# Patient Record
Sex: FEMALE | Race: WHITE | NOT HISPANIC OR LATINO | Employment: UNEMPLOYED | URBAN - METROPOLITAN AREA
[De-identification: names, ages, dates, MRNs, and addresses within clinical notes are randomized per-mention and may not be internally consistent; named-entity substitution may affect disease eponyms.]

---

## 2018-01-04 ENCOUNTER — HOSPITAL ENCOUNTER (OUTPATIENT)
Dept: MAMMOGRAPHY | Facility: HOSPITAL | Age: 33
Discharge: HOME | End: 2018-01-04
Attending: OBSTETRICS & GYNECOLOGY | Admitting: OBSTETRICS & GYNECOLOGY

## 2019-11-16 ENCOUNTER — HOSPITAL ENCOUNTER (EMERGENCY)
Facility: HOSPITAL | Age: 34
Discharge: HOME | End: 2019-11-16
Admitting: EMERGENCY MEDICINE

## 2019-11-16 VITALS
WEIGHT: 134.7 LBS | BODY MASS INDEX: 23.87 KG/M2 | TEMPERATURE: 98.6 F | HEART RATE: 88 BPM | RESPIRATION RATE: 16 BRPM | HEIGHT: 63 IN | DIASTOLIC BLOOD PRESSURE: 74 MMHG | OXYGEN SATURATION: 99 % | SYSTOLIC BLOOD PRESSURE: 115 MMHG

## 2019-11-16 DIAGNOSIS — H60.91 OTITIS EXTERNA OF RIGHT EAR, UNSPECIFIED CHRONICITY, UNSPECIFIED TYPE: ICD-10-CM

## 2019-11-16 DIAGNOSIS — R68.84 JAW PAIN: ICD-10-CM

## 2019-11-16 DIAGNOSIS — K08.89 PAIN, DENTAL: Primary | ICD-10-CM

## 2019-11-16 PROCEDURE — 99283 EMERGENCY DEPT VISIT LOW MDM: CPT

## 2019-11-16 RX ORDER — CIPROFLOXACIN AND DEXAMETHASONE 3; 1 MG/ML; MG/ML
4 SUSPENSION/ DROPS AURICULAR (OTIC) 2 TIMES DAILY
Qty: 7.5 ML | Refills: 0 | OUTPATIENT
Start: 2019-11-16 | End: 2020-02-15

## 2019-11-16 RX ORDER — TRAMADOL HYDROCHLORIDE 50 MG/1
50 TABLET ORAL EVERY 6 HOURS PRN
Qty: 6 TABLET | Refills: 0 | Status: SHIPPED | OUTPATIENT
Start: 2019-11-16 | End: 2020-02-15 | Stop reason: SDUPTHER

## 2019-11-16 RX ORDER — TRAMADOL HYDROCHLORIDE 50 MG/1
50 TABLET ORAL ONCE
Status: COMPLETED | OUTPATIENT
Start: 2019-11-16 | End: 2019-11-16

## 2019-11-16 RX ADMIN — TRAMADOL HYDROCHLORIDE 50 MG: 50 TABLET, FILM COATED ORAL at 12:23

## 2019-11-16 NOTE — ED PROVIDER NOTES
Subjective   Patient is a 34-year-old  female with no past medical history who presents the ER complaining of right-sided jaw pain and dental pain for 4 days.  Patient reports that she has had dental caries in the past, reports that she went to the ER a couple weeks ago and received clindamycin for possible infection.  Patient reports that she took a few days of this but stopped due to side effect of diarrhea.  Patient states that pain has been progressively getting worse for the past few days, mostly in her right jaw, does not radiate, denies swelling, redness or drainage.  Patient describes the pain as a throbbing pain is constant, rates it a 6/10.  She denies any other symptoms, no abdominal, nausea, vomiting, chest pain shortness of breath headache or fever.  She reports that she has an appointment with her dentist on 11/20, 4 days from now, she states that she has been taking ibuprofen at home which has provided no relief.  Patient also complains of some right ear pain, no discharge, bleeding or tinnitus.  Patient denies sore throat, difficulty breathing, throat swelling, cough or congestion.            Review of Systems   Constitutional: Negative for fever.   HENT: Positive for dental problem. Negative for congestion, drooling, facial swelling, postnasal drip, sore throat, trouble swallowing and voice change.    Respiratory: Negative for shortness of breath and wheezing.    Cardiovascular: Negative for chest pain.   Gastrointestinal: Negative for abdominal pain.   Genitourinary: Negative for dysuria.   Musculoskeletal: Positive for arthralgias.        Right-sided jaw pain   Skin: Negative for rash.   Neurological: Negative for weakness and headaches.   Psychiatric/Behavioral: Negative for behavioral problems.   All other systems reviewed and are negative.      History reviewed. No pertinent past medical history.    No Known Allergies    History reviewed. No pertinent surgical history.    History  "reviewed. No pertinent family history.    Social History     Socioeconomic History   • Marital status: Single     Spouse name: Not on file   • Number of children: Not on file   • Years of education: Not on file   • Highest education level: Not on file   Tobacco Use   • Smoking status: Current Every Day Smoker     Packs/day: 0.50     Types: Cigarettes   • Smokeless tobacco: Never Used   Substance and Sexual Activity   • Alcohol use: No     Frequency: Never   • Drug use: No           Objective   Physical Exam   Constitutional: She is oriented to person, place, and time. She appears well-developed and well-nourished. No distress.   HENT:   Head: Normocephalic and atraumatic.   Eyes: EOM are normal. Pupils are equal, round, and reactive to light.   Neck: Normal range of motion. Neck supple.   Cervical spine: No midline tenderness to palpation. No step-offs or deformities. Pain-free range of motion.   Cardiovascular: Normal rate, regular rhythm, normal heart sounds and intact distal pulses.   No murmur heard.  Pulmonary/Chest: Effort normal and breath sounds normal. She has no wheezes.   Abdominal: Soft. Bowel sounds are normal.   Musculoskeletal: Normal range of motion. She exhibits tenderness.   Lymphadenopathy:     She has no cervical adenopathy.   Neurological: She is alert and oriented to person, place, and time. No sensory deficit.   Skin: Skin is warm. Capillary refill takes less than 2 seconds. No rash noted.   No overlying erythema, rash or ecchymosis  No fluctuance or abscess   Psychiatric: She has a normal mood and affect. Her behavior is normal. Thought content normal.   Vitals reviewed.      Procedures           ED Course    /74 (BP Location: Right arm, Patient Position: Sitting)   Pulse 88   Temp 98.6 °F (37 °C) (Oral)   Resp 16   Ht 160 cm (63\")   Wt 61.1 kg (134 lb 11.2 oz)   LMP 09/23/2019 (Approximate)   SpO2 99%   Breastfeeding? No   BMI 23.86 kg/m²   Labs Reviewed - No data to " display  Medications   traMADol (ULTRAM) tablet 50 mg (50 mg Oral Given 11/16/19 1223)     No radiology results for the last day                MDM  Number of Diagnoses or Management Options  Jaw pain:   Otitis externa of right ear, unspecified chronicity, unspecified type:   Pain, dental:   Diagnosis management comments: MEDICAL DECISION  Comorbidities: Dental caries  Differentials: Prior dental abscess, dental caries, Kirill angina, otitis media otitis externa, mastoiditis; this list is not all inclusive and does not constitute the entirety of considered causes    Patient seen and evaluated by myself here in the emergency room.  Patient has obvious dental caries of tooth 29, 28, 27 on lower right jaw, as well as tooth 18, 19 of left lower jaw.  Patient is missing teeth 31 and 30.  On examination there is no signs of periodontal or periapical abscess, no fluctuance, drainage or bleeding noted.  No significant swelling of face or jaw, no Jazmyne's angina.  Patient tender to palpation along surface of the right jaw, no radiation.  Patient appears to have mild erythema of the external auditory canal of the right ear, with most likely otitis externa.  Patient has no tenderness palpation of mastoid process bilaterally.  Patient has no cervical adenopathy.  Patient's uvula was midline, no posterior pharynx erythema or edema, exudate.   Patient made afebrile, nontoxic in appearance and in no acute respiratory distress throughout emergency room stay.  Patient reported that she had been on clindamycin for some time but stopped this due to side effect of diarrhea.  Patient will be discharged with prescription for Ciprodex solution and tramadol for pain.  Inspect was queried.  Patient strongly recommended to keep her appointment with her dentist on 11/20.  Discharge plan and instructions were discussed with the patient who verbalized understanding and is in agreement with the plan, all questions were answered at this time.   Patient is aware of signs symptoms that would require immediate return to the emergency room.  Patient understands importance of following up with primary care provider for further valuation and worsening concerns.    Patient was discharged in improved stable condition with a right steady gait.        Final diagnoses:   Pain, dental   Otitis externa of right ear, unspecified chronicity, unspecified type   Jaw pain              Shonda Sanchez PA  11/16/19 3405

## 2019-11-16 NOTE — ED NOTES
"Tooth pain on the right lower side of jaw, reports seeing dentist, reports \"I cant take the pain today\"     Shelly Gonzales RN  11/16/19 1128       Shelly Gonzales RN  11/16/19 1129    "

## 2019-11-16 NOTE — DISCHARGE INSTRUCTIONS
Take ibuprofen and Tylenol as needed for pain.  Do not mix improving with Advil, Aleve, Motrin, diclofenac or naproxen.  May take tramadol as needed for increased pain.  Apply eardrops 2 drops 4 times a day in the left ear.    Apply ice to jaw as needed for swelling and pain.  Apply 20-minute increments to 3 times a day.    Follow-up with primary care provider as needed for new or worsening concerns.  Keep appointment with dentist this week on 11/20, follow-up with them regarding dental pain.    Return to ER for new or worsening symptoms, increased jaw pain, ear pain, chest pain shortness breath headache or fever.

## 2020-02-14 PROCEDURE — 99283 EMERGENCY DEPT VISIT LOW MDM: CPT

## 2020-02-15 ENCOUNTER — HOSPITAL ENCOUNTER (EMERGENCY)
Facility: HOSPITAL | Age: 35
Discharge: HOME | End: 2020-02-15
Admitting: EMERGENCY MEDICINE

## 2020-02-15 VITALS
OXYGEN SATURATION: 99 % | RESPIRATION RATE: 16 BRPM | BODY MASS INDEX: 23.83 KG/M2 | HEART RATE: 71 BPM | HEIGHT: 63 IN | DIASTOLIC BLOOD PRESSURE: 83 MMHG | TEMPERATURE: 98.3 F | SYSTOLIC BLOOD PRESSURE: 124 MMHG | WEIGHT: 134.48 LBS

## 2020-02-15 DIAGNOSIS — K02.9 DENTAL CARIES: ICD-10-CM

## 2020-02-15 DIAGNOSIS — K08.89 PAIN, DENTAL: Primary | ICD-10-CM

## 2020-02-15 RX ORDER — TRAMADOL HYDROCHLORIDE 50 MG/1
50 TABLET ORAL EVERY 6 HOURS PRN
Qty: 6 TABLET | Refills: 0 | Status: SHIPPED | OUTPATIENT
Start: 2020-02-15

## 2020-02-15 RX ORDER — TRAMADOL HYDROCHLORIDE 50 MG/1
50 TABLET ORAL ONCE
Status: COMPLETED | OUTPATIENT
Start: 2020-02-15 | End: 2020-02-15

## 2020-02-15 RX ORDER — AMOXICILLIN AND CLAVULANATE POTASSIUM 875; 125 MG/1; MG/1
1 TABLET, FILM COATED ORAL EVERY 12 HOURS
Qty: 14 TABLET | Refills: 0 | Status: SHIPPED | OUTPATIENT
Start: 2020-02-15 | End: 2020-02-22

## 2020-02-15 RX ADMIN — TRAMADOL HYDROCHLORIDE 50 MG: 50 TABLET, FILM COATED ORAL at 01:37

## 2020-02-15 RX ADMIN — LIDOCAINE HYDROCHLORIDE: 20 SOLUTION ORAL; TOPICAL at 01:38

## 2020-02-15 NOTE — DISCHARGE INSTRUCTIONS
Use tramadol and dental balls as needed for pain.  Do not operate heavy machinery or drink alcohol while taking tramadol.    Follow-up with your dentist office Monday morning for further evaluation or go to dental offices as listed below.    Return to ED for any new or worsening symptoms    Take antibiotic as prescribed.  Be sure to take full course.Consider taking probiotic or eating yogurt daily while on antibiotic and up to 10 days after to replace the good bacteria in your gastrointestinal tract; this can reduce chances of developing a GI infection such as C difficile    Follow-up with your primary care provider in 3-5 days.  If you do not have a primary care provider call [phone] for help in finding one, or you may follow up with [facility] at [phone].

## 2020-02-15 NOTE — ED PROVIDER NOTES
Subjective   History of Present Illness  Patient is a 35-year-old female with no significant PMH who presents with complaints of right lower jaw and dental pain that has been intermittent over the past 5 months.  Patient states he has progressed over the past several days states she has not followed up with a dentist but does have an appointment on March 11 currently rates her pain an 8/10 severity.  Describes as a throbbing type pain she tried taking Tylenol and ibuprofen which does help some with her pain states pain radiates into her right ear at times.  She denies any purulent drainage trouble swallowing sore throat or voice change.  She also denies any fever nausea vomiting abdominal pain chest pain shortness of breath.  Review of Systems   Constitutional: Negative.    HENT: Positive for dental problem and ear pain. Negative for congestion, drooling, ear discharge, nosebleeds, rhinorrhea, sinus pressure, sinus pain, sore throat and trouble swallowing.    Respiratory: Negative.    Cardiovascular: Negative.    Musculoskeletal: Negative for neck pain and neck stiffness.   Skin: Negative.    Neurological: Negative.        No past medical history on file.    No Known Allergies    No past surgical history on file.    No family history on file.    Social History     Socioeconomic History   • Marital status: Single     Spouse name: Not on file   • Number of children: Not on file   • Years of education: Not on file   • Highest education level: Not on file   Tobacco Use   • Smoking status: Current Every Day Smoker     Packs/day: 0.50     Types: Cigarettes   • Smokeless tobacco: Never Used   Substance and Sexual Activity   • Alcohol use: No     Frequency: Never   • Drug use: No           Objective   Physical Exam   Constitutional: She is oriented to person, place, and time. She appears well-developed and well-nourished. No distress.   HENT:   Head: Normocephalic and atraumatic.   Right Ear: External ear normal.   Left  "Ear: External ear normal.   Mouth/Throat: Uvula is midline, oropharynx is clear and moist and mucous membranes are normal. No oral lesions. No trismus in the jaw. Dental caries present. No dental abscesses or uvula swelling. No oropharyngeal exudate, posterior oropharyngeal edema, posterior oropharyngeal erythema or tonsillar abscesses. No tonsillar exudate.   Several dental caries noted tenderness to palpation along the right lower gumline with no surrounding erythema edema or abscess noted   Eyes: Pupils are equal, round, and reactive to light. EOM are normal. No scleral icterus.   Cardiovascular: Normal rate, regular rhythm, normal heart sounds and intact distal pulses. Exam reveals no gallop and no friction rub.   No murmur heard.  Pulmonary/Chest: Effort normal. No stridor. She has no wheezes. She has no rales.   Neurological: She is alert and oriented to person, place, and time. No cranial nerve deficit or sensory deficit.   Skin: Skin is warm. Capillary refill takes less than 2 seconds. No rash noted. She is not diaphoretic. No erythema.   Psychiatric: She has a normal mood and affect. Her behavior is normal.   Nursing note and vitals reviewed.      Procedures           ED Course    /83   Pulse 71   Temp 98.3 °F (36.8 °C)   Resp 16   Ht 160 cm (63\")   Wt 61 kg (134 lb 7.7 oz)   LMP 01/28/2020 (Approximate)   SpO2 99%   Breastfeeding No   BMI 23.82 kg/m²   Medications   traMADol (ULTRAM) tablet 50 mg (has no administration in time range)   dental ball oral suspension with diphenhydrAMINE (has no administration in time range)     Labs Reviewed - No data to display  No radiology results for the last day                                         MDM  Number of Diagnoses or Management Options  Pain, dental:   Diagnosis management comments: Chart Review:  Comorbidity: None  Differentials: Dental abscess, peritonsillar abscess, tumor, dental caries;this list is not all inclusive and does not constitute " the entirety of considered causes  Imaging: Was interpreted by physician and reviewed by myself: Not warranted  Disposition/Treatment:  While in the ED patient was afebrile.  Nontoxic there were no patient was swelling noted no fluctuant abscess noted patient was given tramadol and dental balls for pain patient does have appointment with her dentist next month she was also given [facility] of dentistry's number and immediate dentist number for further evaluation as needed she will be given Augmentin for home inspect queried given small dose of tramadol from as well.  Findings persist patient and family at bedside she voiced understanding of importance of following up with dentist for further evaluation      Final diagnoses:   Pain, dental   Dental caries            García Remy PA  02/15/20 0123

## 2021-07-16 ENCOUNTER — TRANSCRIBE ORDERS (OUTPATIENT)
Dept: ADMINISTRATIVE | Facility: HOSPITAL | Age: 36
End: 2021-07-16

## 2021-07-16 DIAGNOSIS — Z12.31 VISIT FOR SCREENING MAMMOGRAM: Primary | ICD-10-CM

## 2021-12-21 ENCOUNTER — HOSPITAL ENCOUNTER (EMERGENCY)
Facility: HOSPITAL | Age: 36
Discharge: HOME | End: 2021-12-21
Attending: EMERGENCY MEDICINE | Admitting: EMERGENCY MEDICINE

## 2021-12-21 ENCOUNTER — APPOINTMENT (OUTPATIENT)
Dept: CT IMAGING | Facility: HOSPITAL | Age: 36
End: 2021-12-21

## 2021-12-21 ENCOUNTER — APPOINTMENT (OUTPATIENT)
Dept: GENERAL RADIOLOGY | Facility: HOSPITAL | Age: 36
End: 2021-12-21

## 2021-12-21 VITALS
TEMPERATURE: 98.4 F | WEIGHT: 143.52 LBS | DIASTOLIC BLOOD PRESSURE: 65 MMHG | OXYGEN SATURATION: 100 % | SYSTOLIC BLOOD PRESSURE: 109 MMHG | RESPIRATION RATE: 17 BRPM | BODY MASS INDEX: 25.43 KG/M2 | HEIGHT: 63 IN | HEART RATE: 69 BPM

## 2021-12-21 DIAGNOSIS — R51.9 NONINTRACTABLE EPISODIC HEADACHE, UNSPECIFIED HEADACHE TYPE: Primary | ICD-10-CM

## 2021-12-21 DIAGNOSIS — M54.2 NECK PAIN: ICD-10-CM

## 2021-12-21 LAB
ALBUMIN SERPL-MCNC: 4.3 G/DL (ref 3.5–5.2)
ALBUMIN/GLOB SERPL: 1.7 G/DL
ALP SERPL-CCNC: 60 U/L (ref 39–117)
ALT SERPL W P-5'-P-CCNC: 12 U/L (ref 1–33)
AMPHET+METHAMPHET UR QL: NEGATIVE
ANION GAP SERPL CALCULATED.3IONS-SCNC: 9 MMOL/L (ref 5–15)
APTT PPP: 25.4 SECONDS (ref 24–31)
AST SERPL-CCNC: 12 U/L (ref 1–32)
B-HCG UR QL: NEGATIVE
BARBITURATES UR QL SCN: NEGATIVE
BASOPHILS # BLD AUTO: 0 10*3/MM3 (ref 0–0.2)
BASOPHILS NFR BLD AUTO: 0.4 % (ref 0–1.5)
BENZODIAZ UR QL SCN: NEGATIVE
BILIRUB SERPL-MCNC: 0.2 MG/DL (ref 0–1.2)
BILIRUB UR QL STRIP: NEGATIVE
BUN SERPL-MCNC: 8 MG/DL (ref 6–20)
BUN/CREAT SERPL: 11.1 (ref 7–25)
CALCIUM SPEC-SCNC: 9 MG/DL (ref 8.6–10.5)
CANNABINOIDS SERPL QL: NEGATIVE
CHLORIDE SERPL-SCNC: 104 MMOL/L (ref 98–107)
CK SERPL-CCNC: 48 U/L (ref 20–180)
CLARITY UR: CLEAR
CO2 SERPL-SCNC: 26 MMOL/L (ref 22–29)
COCAINE UR QL: NEGATIVE
COLOR UR: YELLOW
CREAT SERPL-MCNC: 0.72 MG/DL (ref 0.57–1)
D DIMER PPP FEU-MCNC: 0.33 MG/L (FEU) (ref 0–0.59)
DEPRECATED RDW RBC AUTO: 48.1 FL (ref 37–54)
EOSINOPHIL # BLD AUTO: 0.1 10*3/MM3 (ref 0–0.4)
EOSINOPHIL NFR BLD AUTO: 2 % (ref 0.3–6.2)
ERYTHROCYTE [DISTWIDTH] IN BLOOD BY AUTOMATED COUNT: 16.1 % (ref 12.3–15.4)
GFR SERPL CREATININE-BSD FRML MDRD: 91 ML/MIN/1.73
GLOBULIN UR ELPH-MCNC: 2.5 GM/DL
GLUCOSE SERPL-MCNC: 82 MG/DL (ref 65–99)
GLUCOSE UR STRIP-MCNC: NEGATIVE MG/DL
HCT VFR BLD AUTO: 36.2 % (ref 34–46.6)
HGB BLD-MCNC: 12.6 G/DL (ref 12–15.9)
HGB UR QL STRIP.AUTO: NEGATIVE
HOLD SPECIMEN: NORMAL
INR PPP: 1.02 (ref 0.93–1.1)
KETONES UR QL STRIP: NEGATIVE
LEUKOCYTE ESTERASE UR QL STRIP.AUTO: NEGATIVE
LYMPHOCYTES # BLD AUTO: 1.4 10*3/MM3 (ref 0.7–3.1)
LYMPHOCYTES NFR BLD AUTO: 26.6 % (ref 19.6–45.3)
MAGNESIUM SERPL-MCNC: 2.1 MG/DL (ref 1.6–2.6)
MCH RBC QN AUTO: 29.3 PG (ref 26.6–33)
MCHC RBC AUTO-ENTMCNC: 34.7 G/DL (ref 31.5–35.7)
MCV RBC AUTO: 84.4 FL (ref 79–97)
METHADONE UR QL SCN: NEGATIVE
MONOCYTES # BLD AUTO: 0.5 10*3/MM3 (ref 0.1–0.9)
MONOCYTES NFR BLD AUTO: 8.4 % (ref 5–12)
NEUTROPHILS NFR BLD AUTO: 3.4 10*3/MM3 (ref 1.7–7)
NEUTROPHILS NFR BLD AUTO: 62.6 % (ref 42.7–76)
NITRITE UR QL STRIP: NEGATIVE
NRBC BLD AUTO-RTO: 0.1 /100 WBC (ref 0–0.2)
OPIATES UR QL: POSITIVE
OXYCODONE UR QL SCN: NEGATIVE
PH UR STRIP.AUTO: 7 [PH] (ref 5–8)
PLATELET # BLD AUTO: 294 10*3/MM3 (ref 140–450)
PMV BLD AUTO: 8 FL (ref 6–12)
POTASSIUM SERPL-SCNC: 4.3 MMOL/L (ref 3.5–5.2)
PROT SERPL-MCNC: 6.8 G/DL (ref 6–8.5)
PROT UR QL STRIP: NEGATIVE
PROTHROMBIN TIME: 11.3 SECONDS (ref 9.6–11.7)
RBC # BLD AUTO: 4.29 10*6/MM3 (ref 3.77–5.28)
SODIUM SERPL-SCNC: 139 MMOL/L (ref 136–145)
SP GR UR STRIP: 1.01 (ref 1–1.03)
TROPONIN T SERPL-MCNC: <0.01 NG/ML (ref 0–0.03)
TSH SERPL DL<=0.05 MIU/L-ACNC: 1.43 UIU/ML (ref 0.27–4.2)
UROBILINOGEN UR QL STRIP: NORMAL
WBC NRBC COR # BLD: 5.4 10*3/MM3 (ref 3.4–10.8)

## 2021-12-21 PROCEDURE — 25010000002 KETOROLAC TROMETHAMINE PER 15 MG: Performed by: EMERGENCY MEDICINE

## 2021-12-21 PROCEDURE — 25010000002 ONDANSETRON PER 1 MG: Performed by: EMERGENCY MEDICINE

## 2021-12-21 PROCEDURE — 80050 GENERAL HEALTH PANEL: CPT | Performed by: EMERGENCY MEDICINE

## 2021-12-21 PROCEDURE — 85730 THROMBOPLASTIN TIME PARTIAL: CPT | Performed by: EMERGENCY MEDICINE

## 2021-12-21 PROCEDURE — 96375 TX/PRO/DX INJ NEW DRUG ADDON: CPT

## 2021-12-21 PROCEDURE — 80307 DRUG TEST PRSMV CHEM ANLYZR: CPT | Performed by: EMERGENCY MEDICINE

## 2021-12-21 PROCEDURE — 85610 PROTHROMBIN TIME: CPT | Performed by: EMERGENCY MEDICINE

## 2021-12-21 PROCEDURE — 83735 ASSAY OF MAGNESIUM: CPT | Performed by: EMERGENCY MEDICINE

## 2021-12-21 PROCEDURE — 71045 X-RAY EXAM CHEST 1 VIEW: CPT

## 2021-12-21 PROCEDURE — 81003 URINALYSIS AUTO W/O SCOPE: CPT | Performed by: EMERGENCY MEDICINE

## 2021-12-21 PROCEDURE — 96374 THER/PROPH/DIAG INJ IV PUSH: CPT

## 2021-12-21 PROCEDURE — 36415 COLL VENOUS BLD VENIPUNCTURE: CPT

## 2021-12-21 PROCEDURE — 25010000002 HYDROMORPHONE 1 MG/ML SOLUTION: Performed by: EMERGENCY MEDICINE

## 2021-12-21 PROCEDURE — 0 IOPAMIDOL PER 1 ML: Performed by: EMERGENCY MEDICINE

## 2021-12-21 PROCEDURE — 84484 ASSAY OF TROPONIN QUANT: CPT | Performed by: EMERGENCY MEDICINE

## 2021-12-21 PROCEDURE — 81025 URINE PREGNANCY TEST: CPT | Performed by: EMERGENCY MEDICINE

## 2021-12-21 PROCEDURE — 82550 ASSAY OF CK (CPK): CPT | Performed by: EMERGENCY MEDICINE

## 2021-12-21 PROCEDURE — 99283 EMERGENCY DEPT VISIT LOW MDM: CPT

## 2021-12-21 PROCEDURE — 85379 FIBRIN DEGRADATION QUANT: CPT | Performed by: EMERGENCY MEDICINE

## 2021-12-21 PROCEDURE — 70491 CT SOFT TISSUE NECK W/DYE: CPT

## 2021-12-21 PROCEDURE — 70450 CT HEAD/BRAIN W/O DYE: CPT

## 2021-12-21 PROCEDURE — 36415 COLL VENOUS BLD VENIPUNCTURE: CPT | Performed by: EMERGENCY MEDICINE

## 2021-12-21 RX ORDER — SODIUM CHLORIDE 0.9 % (FLUSH) 0.9 %
10 SYRINGE (ML) INJECTION AS NEEDED
Status: DISCONTINUED | OUTPATIENT
Start: 2021-12-21 | End: 2021-12-21 | Stop reason: HOSPADM

## 2021-12-21 RX ORDER — KETOROLAC TROMETHAMINE 15 MG/ML
15 INJECTION, SOLUTION INTRAMUSCULAR; INTRAVENOUS ONCE
Status: COMPLETED | OUTPATIENT
Start: 2021-12-21 | End: 2021-12-21

## 2021-12-21 RX ORDER — ONDANSETRON 2 MG/ML
4 INJECTION INTRAMUSCULAR; INTRAVENOUS ONCE
Status: COMPLETED | OUTPATIENT
Start: 2021-12-21 | End: 2021-12-21

## 2021-12-21 RX ADMIN — ONDANSETRON 4 MG: 2 INJECTION INTRAMUSCULAR; INTRAVENOUS at 11:34

## 2021-12-21 RX ADMIN — HYDROMORPHONE HYDROCHLORIDE 0.5 MG: 1 INJECTION, SOLUTION INTRAMUSCULAR; INTRAVENOUS; SUBCUTANEOUS at 11:34

## 2021-12-21 RX ADMIN — IOPAMIDOL 100 ML: 755 INJECTION, SOLUTION INTRAVENOUS at 11:25

## 2021-12-21 RX ADMIN — SODIUM CHLORIDE 500 ML: 9 INJECTION, SOLUTION INTRAVENOUS at 09:18

## 2021-12-21 RX ADMIN — KETOROLAC TROMETHAMINE 15 MG: 15 INJECTION, SOLUTION INTRAMUSCULAR; INTRAVENOUS at 09:18

## 2021-12-21 NOTE — DISCHARGE INSTRUCTIONS
You need to make sure you were evaluated for your recurrent pain in your left breast.  You need to be treated, evaluated to make sure there are no signs of cancer.
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
## 2022-02-09 ENCOUNTER — TRANSCRIBE ORDERS (OUTPATIENT)
Dept: PHYSICAL THERAPY | Facility: CLINIC | Age: 37
End: 2022-02-09

## 2022-02-09 DIAGNOSIS — M54.16 LUMBAR RADICULOPATHY: Primary | ICD-10-CM

## 2022-03-14 ENCOUNTER — TREATMENT (OUTPATIENT)
Dept: PHYSICAL THERAPY | Facility: CLINIC | Age: 37
End: 2022-03-14

## 2022-03-14 DIAGNOSIS — M54.16 RADICULOPATHY, LUMBAR REGION: Primary | ICD-10-CM

## 2022-03-14 PROCEDURE — 97162 PT EVAL MOD COMPLEX 30 MIN: CPT | Performed by: PHYSICAL THERAPIST
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
## 2022-03-22 ENCOUNTER — TREATMENT (OUTPATIENT)
Dept: PHYSICAL THERAPY | Facility: CLINIC | Age: 37
End: 2022-03-22

## 2022-03-22 DIAGNOSIS — M54.16 RADICULOPATHY, LUMBAR REGION: Primary | ICD-10-CM

## 2022-03-22 PROCEDURE — 97110 THERAPEUTIC EXERCISES: CPT | Performed by: PHYSICAL THERAPIST

## 2022-03-22 PROCEDURE — 97140 MANUAL THERAPY 1/> REGIONS: CPT | Performed by: PHYSICAL THERAPIST

## 2022-03-22 NOTE — PROGRESS NOTES
"   Physical Therapy Daily Progress Note    Patient: Sumi Dale   : 1985  Diagnosis/ICD-10 Code:  Radiculopathy, lumbar region [M54.16]  Referring practitioner: LILI Lindsey  Date of Initial Visit: Type: THERAPY  Noted: 3/14/2022  Today's Date: 3/22/2022  Patient seen for 2 sessions         Sumi Dale reports:  Numbness in cervical spine and shoulders today (typically her baseline discomfort).  Lower back still sore, feels like it is \"pinching\".       Objective   See Exercise, Manual, and Modality Logs for complete treatment.       Assessment/Plan     Tolerates manual therapy to cervical and lumbar spine well today.  Noted hypomobility in dylan hips.  Patient feeling well at end of visit.     Progress per Plan of Care           Manual Therapy:    30     mins  40123;  Therapeutic Exercise:    8     mins  84978;     Neuromuscular Sabrina:        mins  05139;    Therapeutic Activity:          mins  92214;     Gait Training:           mins  28714;     Ultrasound:          mins  08671;    Electrical Stimulation:         mins  54675 ( );  Dry Needling          mins self-pay    Timed Treatment:   38   mins   Total Treatment:     38   mins    Rojas Crawford PT  Physical Therapist                      "

## 2022-03-30 ENCOUNTER — TREATMENT (OUTPATIENT)
Dept: PHYSICAL THERAPY | Facility: CLINIC | Age: 37
End: 2022-03-30

## 2022-03-30 DIAGNOSIS — M54.16 RADICULOPATHY, LUMBAR REGION: Primary | ICD-10-CM

## 2022-03-30 PROCEDURE — 97112 NEUROMUSCULAR REEDUCATION: CPT | Performed by: PHYSICAL THERAPIST

## 2022-03-30 PROCEDURE — 97140 MANUAL THERAPY 1/> REGIONS: CPT | Performed by: PHYSICAL THERAPIST

## 2022-03-30 NOTE — PROGRESS NOTES
Physical Therapy Daily Progress Note    VISIT#: 3    Subjective   Sumi Dale reports that her neck is feeling a little better but the pain in her low back is still bad.   Pain Ratin    Objective     See Exercise, Manual, and Modality Logs for complete treatment.     Patient Education: muscle tension, diaphragmatic breathing, HEP    Assessment/Plan   Pt presented with improved neck symptoms but severe back pain. Treatment focused on decreased muscle tone and improved ROM. Pt tolerated manual therapy well and exhibits increased tone in L QL and hip abductors. Educated pt on referred pain, trigger points and benefits of diaphragmatic breathing. Updated HEP and advised pt to keep all exercises pain free, pt showed good understanding.     Progress per Plan of Care and Progress strengthening /stabilization /functional activity          Timed:         Manual Therapy:    25     mins  53377;     Therapeutic Exercise:         mins  21258;     Neuromuscular Sabrina:    15    mins  93661;    Therapeutic Activity:          mins  57717;     Gait Training:           mins  39115;     Ultrasound:          mins  49932;    Ionto                                   mins   09671  Self Care                            mins   76676  Canalith Repos                   mins  93907    Un-Timed:  Electrical Stimulation:         mins  16600 ( );  Dry Needling          mins self-pay  Traction          mins 51725  Low Eval          Mins  58143  Mod Eval          Mins  08537  High Eval                            Mins  01173  Re-Eval                               mins  90444    Timed Treatment:   40   mins   Total Treatment:     40   mins          Jackie Salazar  Physical Therapist Assistant  [place] License # [id number]

## 2022-04-06 ENCOUNTER — TREATMENT (OUTPATIENT)
Dept: PHYSICAL THERAPY | Facility: CLINIC | Age: 37
End: 2022-04-06

## 2022-04-06 DIAGNOSIS — M54.16 RADICULOPATHY, LUMBAR REGION: Primary | ICD-10-CM

## 2022-04-06 PROCEDURE — 97140 MANUAL THERAPY 1/> REGIONS: CPT | Performed by: PHYSICAL THERAPIST

## 2022-04-06 NOTE — PROGRESS NOTES
Physical Therapy Daily Progress Note    Patient: Sumi Dale   : 1985  Diagnosis/ICD-10 Code:  Radiculopathy, lumbar region [M54.16]  Referring practitioner: LILI Lindsey  Date of Initial Visit: Type: THERAPY  Noted: 3/14/2022  Today's Date: 2022  Patient seen for 4 sessions         Sumi Dale reports:  Lower back continues to be painful with prolonged standing.  Still getting pain in bilateral feet after long shift at work.  Patient reports neck pain has been decreasing.     Objective   See Exercise, Manual, and Modality Logs for complete treatment.       Assessment/Plan     Tolerates manual therapy well.  Noted limited PROM with right sidebending/left rotation of lower cervical spine.  Feeling well at end of session.     Progress per Plan of Care           Manual Therapy:    40     mins  80035;  Therapeutic Exercise:         mins  86488;     Neuromuscular Sabrina:        mins  89669;    Therapeutic Activity:          mins  87912;     Gait Training:           mins  19692;     Ultrasound:          mins  58621;    Electrical Stimulation:         mins  48512 ( );  Dry Needling          mins self-pay    Timed Treatment:   40   mins   Total Treatment:     40   mins    Rojas Crawford PT  Physical Therapist

## 2022-04-14 ENCOUNTER — TREATMENT (OUTPATIENT)
Dept: PHYSICAL THERAPY | Facility: CLINIC | Age: 37
End: 2022-04-14

## 2022-04-14 DIAGNOSIS — M54.16 RADICULOPATHY, LUMBAR REGION: Primary | ICD-10-CM

## 2022-04-14 PROCEDURE — 97110 THERAPEUTIC EXERCISES: CPT | Performed by: PHYSICAL THERAPIST

## 2022-04-14 PROCEDURE — 97140 MANUAL THERAPY 1/> REGIONS: CPT | Performed by: PHYSICAL THERAPIST

## 2022-04-14 NOTE — PROGRESS NOTES
Physical Therapy Daily Progress Note    VISIT#: 5    Subjective   Sumi Dale reports that her neck is hurting today and it has moved down into her thoracic spine.   Pain Ratin    Objective     See Exercise, Manual, and Modality Logs for complete treatment.     Patient Education: HEP, muscle tone, thoracic mobility    Assessment/Plan   Pt requested to shorten session today due to other obligations. Pt presented with increased neck and thoracic pain. Pt exhibits increased tone in the thoracic ES and hypomobility of the upper thoracic spine. Pt had mild tenderness with manual therapy but tolerated well. Educated pt on importance of improving mobility and added open books and thread the needle to HEP.     Progress per Plan of Care and Progress strengthening /stabilization /functional activity          Timed:         Manual Therapy:    16     mins  12789;     Therapeutic Exercise:    12     mins  90249;     Neuromuscular Sabrina:        mins  63088;    Therapeutic Activity:          mins  21693;     Gait Training:           mins  90274;     Ultrasound:          mins  76514;    Ionto                                   mins   35737  Self Care                            mins   66216  Canalith Repos                   mins  70640    Un-Timed:  Electrical Stimulation:         mins  77645 ( );  Dry Needling          mins self-pay  Traction          mins 04369  Low Eval          Mins  28319  Mod Eval          Mins  66530  High Eval                            Mins  51055  Re-Eval                               mins  03534    Timed Treatment:   28   mins   Total Treatment:     28   mins          Jackie Salazar  Physical Therapist Assistant  [place] License # [id number]

## 2022-04-19 ENCOUNTER — TREATMENT (OUTPATIENT)
Dept: PHYSICAL THERAPY | Facility: CLINIC | Age: 37
End: 2022-04-19

## 2022-04-19 DIAGNOSIS — M54.16 RADICULOPATHY, LUMBAR REGION: Primary | ICD-10-CM

## 2022-04-19 PROCEDURE — 97140 MANUAL THERAPY 1/> REGIONS: CPT | Performed by: PHYSICAL THERAPIST

## 2022-04-19 PROCEDURE — 97110 THERAPEUTIC EXERCISES: CPT | Performed by: PHYSICAL THERAPIST

## 2022-04-19 NOTE — PROGRESS NOTES
Re-Assessment / Re-Certification        Patient: Sumi Dale   : 1985  Diagnosis/ICD-10 Code:  Radiculopathy, lumbar region [M54.16]  Referring practitioner: LILI Lindsey  Date of Initial Visit: Type: THERAPY  Noted: 3/14/2022  Today's Date: 2022  Patient seen for 6 sessions      Subjective:   Sumi Dale reports: Lower back/neck continue to be painful.  Patient reports missing a few days of work due to the pain.  Reports compliance with HEP.  Clinical Progress: unchanged  Home Program Compliance: Yes  Treatment has included: therapeutic exercise, neuromuscular re-education, manual therapy, therapeutic activity and moist heat     Pain level in cervical spine 7/10  Pain level in lumbar spine 9/10     Objective          Tenderness     Additional Tenderness Details  C3-4, C4-5, C5-6 hypomobile and TTP with UPA on right    T2-T4 TTP with PA glide and hypomobile    Active Range of Motion   Cervical/Thoracic Spine   Cervical    Left lateral flexion: 20 degrees   Right lateral flexion: 15 degrees with pain  Left rotation: 50 degrees with pain  Right rotation: 60 degrees     Passive Range of Motion   Left Hip   External rotation (90/90): WFL  Internal rotation (90/90): 35 degrees with pain    Right Hip   External rotation (90/90): WFL  Internal rotation (90/90): 40 degrees       Assessment/Plan  Progress toward previous goals: Not Met     Goals:  In two weeks, patient will report at least 25% reduction in pain level.  NM  In two weeks, patient will demonstrate at least 25% improvement in AROM in cervical spine and lumbar spine.  NM    In four weeks, patient will demonstrate at least 60 degrees of cervical rotation bilaterally. NM  In four weeks, patient will demonstrate lumbar AROM WFL without pain level over 2/10.  NM  In four weeks, patient will demonstrate decreased perceived disability by decreasing score NDI and Oswestry by at least 12% each.  NM    New Goals  Short-term goals (STG): In two  weeks, patient will report completing work activities without pain level greater than 4/10 for at least three consecutive days.   Long-term goals (LTG):   In four weeks, patient will demonstrate cervical AROM wnl without pain level over 2/10.   In four weeks, patient will demonstrate decreased perceived disability by decreasing score NDI and Oswestry by at least 12% each.      Recommendations: Continue as planned  Timeframe: 1 month  Prognosis to achieve goals: fair    PT Signature: Rojas Crawford PT      Based upon review of the patient's progress and continued therapy plan, it is my medical opinion that Sumi Dale should continue physical therapy treatment at [facility]  [address] [place] [id number] [phone].    Signature: __________________________________  Fredo Werner PA    Manual Therapy:    30     mins  21480;  Therapeutic Exercise:    15     mins  79791;     Neuromuscular Sabrina:        mins  94647;    Therapeutic Activity:          mins  96935;     Gait Training:           mins  98221;     Ultrasound:          mins  45148;    Electrical Stimulation:         mins  69739 ( );  Dry Needling          mins self-pay    Timed Treatment:   45   mins   Total Treatment:     45   mins

## 2022-04-21 ENCOUNTER — HOSPITAL ENCOUNTER (OUTPATIENT)
Dept: MAMMOGRAPHY | Facility: HOSPITAL | Age: 37
Discharge: HOME | End: 2022-04-21
Admitting: OBSTETRICS & GYNECOLOGY

## 2022-04-21 DIAGNOSIS — Z12.31 VISIT FOR SCREENING MAMMOGRAM: ICD-10-CM

## 2022-04-21 PROCEDURE — 77067 SCR MAMMO BI INCL CAD: CPT

## 2022-04-21 PROCEDURE — 77063 BREAST TOMOSYNTHESIS BI: CPT

## 2022-04-28 ENCOUNTER — TREATMENT (OUTPATIENT)
Dept: PHYSICAL THERAPY | Facility: CLINIC | Age: 37
End: 2022-04-28

## 2022-04-28 DIAGNOSIS — M54.16 RADICULOPATHY, LUMBAR REGION: Primary | ICD-10-CM

## 2022-04-28 PROCEDURE — 97110 THERAPEUTIC EXERCISES: CPT | Performed by: PHYSICAL THERAPIST

## 2022-04-28 PROCEDURE — 97140 MANUAL THERAPY 1/> REGIONS: CPT | Performed by: PHYSICAL THERAPIST

## 2022-04-28 PROCEDURE — 97530 THERAPEUTIC ACTIVITIES: CPT | Performed by: PHYSICAL THERAPIST

## 2022-04-28 NOTE — PROGRESS NOTES
Physical Therapy Daily Progress Note    VISIT#: 7    Subjective   Sumi Dale reports that the pain in her left hip and low back is sometimes less severe than it used to be. She is having the most pain today in her L knee.   Pain Ratin    Objective     See Exercise, Manual, and Modality Logs for complete treatment.     Patient Education: possible MRI scheduled    Assessment/Plan   Pt exhibits less tenderness and less complaints of pain with bed mobility and exercises in clinic than in previous sessions. Pt has been off work for about a week now which she states has possibly helped some of her pain. She saw her MD yesterday and states that he would not give her a note excusing her from work for the time being, but she believes he is attempting to schedule an MRI.     Progress per Plan of Care and Progress strengthening /stabilization /functional activity          Timed:         Manual Therapy:    18     mins  66352;     Therapeutic Exercise:    15     mins  28977;     Neuromuscular Sabrina:        mins  05303;    Therapeutic Activity:     8     mins  84838;     Gait Training:           mins  45908;     Ultrasound:          mins  90936;    Ionto                                   mins   80010  Self Care                            mins   50381  Canalith Repos                   mins  35159    Un-Timed:  Electrical Stimulation:         mins  22401 ( );  Dry Needling          mins self-pay  Traction          mins 56794  Low Eval          Mins  00882  Mod Eval          Mins  39664  High Eval                            Mins  65388  Re-Eval                               mins  36263    Timed Treatment:   41   mins   Total Treatment:     41   mins          Jackie Salazar  Physical Therapist Assistant  [place] License # [id number]

## 2022-05-02 ENCOUNTER — HOSPITAL ENCOUNTER (OUTPATIENT)
Dept: MAMMOGRAPHY | Facility: HOSPITAL | Age: 37
Discharge: HOME | End: 2022-05-02

## 2022-05-02 ENCOUNTER — HOSPITAL ENCOUNTER (OUTPATIENT)
Dept: ULTRASOUND IMAGING | Facility: HOSPITAL | Age: 37
Discharge: HOME | End: 2022-05-02

## 2022-05-02 DIAGNOSIS — N64.89 BREAST ASYMMETRY: ICD-10-CM

## 2022-05-02 PROCEDURE — G0279 TOMOSYNTHESIS, MAMMO: HCPCS

## 2022-05-02 PROCEDURE — 76642 ULTRASOUND BREAST LIMITED: CPT

## 2022-05-02 PROCEDURE — 77066 DX MAMMO INCL CAD BI: CPT

## 2022-05-09 ENCOUNTER — TRANSCRIBE ORDERS (OUTPATIENT)
Dept: ADMINISTRATIVE | Facility: HOSPITAL | Age: 37
End: 2022-05-09

## 2022-05-09 DIAGNOSIS — R92.8 ABNORMAL MAMMOGRAM: Primary | ICD-10-CM

## 2022-05-12 ENCOUNTER — TRANSCRIBE ORDERS (OUTPATIENT)
Dept: ADMINISTRATIVE | Facility: HOSPITAL | Age: 37
End: 2022-05-12

## 2022-05-12 DIAGNOSIS — R92.8 ABNORMAL MAMMOGRAM: Primary | ICD-10-CM

## 2022-11-02 ENCOUNTER — HOSPITAL ENCOUNTER (OUTPATIENT)
Dept: ULTRASOUND IMAGING | Facility: HOSPITAL | Age: 37
End: 2022-11-02

## 2022-11-02 ENCOUNTER — APPOINTMENT (OUTPATIENT)
Dept: MAMMOGRAPHY | Facility: HOSPITAL | Age: 37
End: 2022-11-02

## 2024-07-08 ENCOUNTER — HOSPITAL ENCOUNTER (EMERGENCY)
Facility: HOSPITAL | Age: 39
Discharge: HOME | End: 2024-07-08
Admitting: EMERGENCY MEDICINE
Payer: MEDICAID

## 2024-07-08 ENCOUNTER — APPOINTMENT (OUTPATIENT)
Dept: GENERAL RADIOLOGY | Facility: HOSPITAL | Age: 39
End: 2024-07-08
Payer: MEDICAID

## 2024-07-08 VITALS
BODY MASS INDEX: 23.56 KG/M2 | WEIGHT: 138 LBS | HEART RATE: 77 BPM | SYSTOLIC BLOOD PRESSURE: 102 MMHG | TEMPERATURE: 98 F | OXYGEN SATURATION: 100 % | DIASTOLIC BLOOD PRESSURE: 66 MMHG | RESPIRATION RATE: 16 BRPM | HEIGHT: 64 IN

## 2024-07-08 DIAGNOSIS — N39.0 UTI (URINARY TRACT INFECTION), UNCOMPLICATED: Primary | ICD-10-CM

## 2024-07-08 LAB
ALBUMIN SERPL-MCNC: 4.2 G/DL (ref 3.5–5.2)
ALBUMIN/GLOB SERPL: 1.8 G/DL
ALP SERPL-CCNC: 69 U/L (ref 39–117)
ALT SERPL W P-5'-P-CCNC: 11 U/L (ref 1–33)
ANION GAP SERPL CALCULATED.3IONS-SCNC: 9.6 MMOL/L (ref 5–15)
AST SERPL-CCNC: 11 U/L (ref 1–32)
B-HCG UR QL: NEGATIVE
BACTERIA UR QL AUTO: ABNORMAL /HPF
BASOPHILS # BLD AUTO: 0.04 10*3/MM3 (ref 0–0.2)
BASOPHILS NFR BLD AUTO: 0.6 % (ref 0–1.5)
BILIRUB SERPL-MCNC: 0.4 MG/DL (ref 0–1.2)
BILIRUB UR QL STRIP: NEGATIVE
BUN SERPL-MCNC: 11 MG/DL (ref 6–20)
BUN/CREAT SERPL: 18.3 (ref 7–25)
CALCIUM SPEC-SCNC: 8.7 MG/DL (ref 8.6–10.5)
CHLORIDE SERPL-SCNC: 107 MMOL/L (ref 98–107)
CLARITY UR: CLEAR
CO2 SERPL-SCNC: 21.4 MMOL/L (ref 22–29)
COLOR UR: YELLOW
CREAT SERPL-MCNC: 0.6 MG/DL (ref 0.57–1)
DEPRECATED RDW RBC AUTO: 47.9 FL (ref 37–54)
EGFRCR SERPLBLD CKD-EPI 2021: 115.8 ML/MIN/1.73
EOSINOPHIL # BLD AUTO: 0.45 10*3/MM3 (ref 0–0.4)
EOSINOPHIL NFR BLD AUTO: 6.4 % (ref 0.3–6.2)
ERYTHROCYTE [DISTWIDTH] IN BLOOD BY AUTOMATED COUNT: 15 % (ref 12.3–15.4)
FLUAV RNA RESP QL NAA+PROBE: NOT DETECTED
FLUBV RNA RESP QL NAA+PROBE: NOT DETECTED
GLOBULIN UR ELPH-MCNC: 2.4 GM/DL
GLUCOSE SERPL-MCNC: 93 MG/DL (ref 65–99)
GLUCOSE UR STRIP-MCNC: NEGATIVE MG/DL
HCT VFR BLD AUTO: 36 % (ref 34–46.6)
HGB BLD-MCNC: 11.1 G/DL (ref 12–15.9)
HGB UR QL STRIP.AUTO: NEGATIVE
HYALINE CASTS UR QL AUTO: ABNORMAL /LPF
IMM GRANULOCYTES # BLD AUTO: 0.01 10*3/MM3 (ref 0–0.05)
IMM GRANULOCYTES NFR BLD AUTO: 0.1 % (ref 0–0.5)
KETONES UR QL STRIP: NEGATIVE
LEUKOCYTE ESTERASE UR QL STRIP.AUTO: ABNORMAL
LYMPHOCYTES # BLD AUTO: 1.68 10*3/MM3 (ref 0.7–3.1)
LYMPHOCYTES NFR BLD AUTO: 23.8 % (ref 19.6–45.3)
MCH RBC QN AUTO: 27.1 PG (ref 26.6–33)
MCHC RBC AUTO-ENTMCNC: 30.8 G/DL (ref 31.5–35.7)
MCV RBC AUTO: 87.8 FL (ref 79–97)
MONOCYTES # BLD AUTO: 0.48 10*3/MM3 (ref 0.1–0.9)
MONOCYTES NFR BLD AUTO: 6.8 % (ref 5–12)
NEUTROPHILS NFR BLD AUTO: 4.41 10*3/MM3 (ref 1.7–7)
NEUTROPHILS NFR BLD AUTO: 62.3 % (ref 42.7–76)
NITRITE UR QL STRIP: NEGATIVE
NRBC BLD AUTO-RTO: 0 /100 WBC (ref 0–0.2)
PH UR STRIP.AUTO: 8 [PH] (ref 5–8)
PLATELET # BLD AUTO: 257 10*3/MM3 (ref 140–450)
PMV BLD AUTO: 9.9 FL (ref 6–12)
POTASSIUM SERPL-SCNC: 3.8 MMOL/L (ref 3.5–5.2)
PROT SERPL-MCNC: 6.6 G/DL (ref 6–8.5)
PROT UR QL STRIP: NEGATIVE
RBC # BLD AUTO: 4.1 10*6/MM3 (ref 3.77–5.28)
RBC # UR STRIP: ABNORMAL /HPF
REF LAB TEST METHOD: ABNORMAL
RSV RNA RESP QL NAA+PROBE: NOT DETECTED
S PYO AG THROAT QL: NEGATIVE
SARS-COV-2 RNA RESP QL NAA+PROBE: NOT DETECTED
SODIUM SERPL-SCNC: 138 MMOL/L (ref 136–145)
SP GR UR STRIP: 1.01 (ref 1–1.03)
SQUAMOUS #/AREA URNS HPF: ABNORMAL /HPF
UROBILINOGEN UR QL STRIP: ABNORMAL
WBC # UR STRIP: ABNORMAL /HPF
WBC NRBC COR # BLD AUTO: 7.07 10*3/MM3 (ref 3.4–10.8)

## 2024-07-08 PROCEDURE — 81025 URINE PREGNANCY TEST: CPT | Performed by: NURSE PRACTITIONER

## 2024-07-08 PROCEDURE — 99284 EMERGENCY DEPT VISIT MOD MDM: CPT

## 2024-07-08 PROCEDURE — 96365 THER/PROPH/DIAG IV INF INIT: CPT

## 2024-07-08 PROCEDURE — 25010000002 CEFTRIAXONE PER 250 MG: Performed by: NURSE PRACTITIONER

## 2024-07-08 PROCEDURE — 93005 ELECTROCARDIOGRAM TRACING: CPT

## 2024-07-08 PROCEDURE — 87086 URINE CULTURE/COLONY COUNT: CPT | Performed by: NURSE PRACTITIONER

## 2024-07-08 PROCEDURE — 85025 COMPLETE CBC W/AUTO DIFF WBC: CPT | Performed by: NURSE PRACTITIONER

## 2024-07-08 PROCEDURE — 87637 SARSCOV2&INF A&B&RSV AMP PRB: CPT | Performed by: NURSE PRACTITIONER

## 2024-07-08 PROCEDURE — 63710000001 ONDANSETRON ODT 4 MG TABLET DISPERSIBLE: Performed by: NURSE PRACTITIONER

## 2024-07-08 PROCEDURE — 87651 STREP A DNA AMP PROBE: CPT | Performed by: NURSE PRACTITIONER

## 2024-07-08 PROCEDURE — 71045 X-RAY EXAM CHEST 1 VIEW: CPT

## 2024-07-08 PROCEDURE — 81001 URINALYSIS AUTO W/SCOPE: CPT | Performed by: NURSE PRACTITIONER

## 2024-07-08 PROCEDURE — 80053 COMPREHEN METABOLIC PANEL: CPT | Performed by: NURSE PRACTITIONER

## 2024-07-08 RX ORDER — DOXYCYCLINE 100 MG/1
100 CAPSULE ORAL ONCE
Status: COMPLETED | OUTPATIENT
Start: 2024-07-08 | End: 2024-07-08

## 2024-07-08 RX ORDER — METRONIDAZOLE 500 MG/1
2000 TABLET ORAL ONCE
Status: DISCONTINUED | OUTPATIENT
Start: 2024-07-08 | End: 2024-07-08

## 2024-07-08 RX ORDER — METRONIDAZOLE 500 MG/1
500 TABLET ORAL ONCE
Status: COMPLETED | OUTPATIENT
Start: 2024-07-08 | End: 2024-07-08

## 2024-07-08 RX ORDER — SODIUM CHLORIDE 0.9 % (FLUSH) 0.9 %
10 SYRINGE (ML) INJECTION AS NEEDED
Status: DISCONTINUED | OUTPATIENT
Start: 2024-07-08 | End: 2024-07-08 | Stop reason: HOSPADM

## 2024-07-08 RX ORDER — ONDANSETRON 4 MG/1
4 TABLET, ORALLY DISINTEGRATING ORAL ONCE
Status: COMPLETED | OUTPATIENT
Start: 2024-07-08 | End: 2024-07-08

## 2024-07-08 RX ORDER — DOXYCYCLINE HYCLATE 100 MG/1
100 TABLET, DELAYED RELEASE ORAL 2 TIMES DAILY
Qty: 14 TABLET | Refills: 0 | Status: SHIPPED | OUTPATIENT
Start: 2024-07-08 | End: 2024-07-15

## 2024-07-08 RX ORDER — AZITHROMYCIN 250 MG/1
1000 TABLET, FILM COATED ORAL ONCE
Status: DISCONTINUED | OUTPATIENT
Start: 2024-07-08 | End: 2024-07-08

## 2024-07-08 RX ADMIN — ONDANSETRON 4 MG: 4 TABLET, ORALLY DISINTEGRATING ORAL at 16:20

## 2024-07-08 RX ADMIN — METRONIDAZOLE 500 MG: 500 TABLET ORAL at 16:20

## 2024-07-08 RX ADMIN — CEFTRIAXONE 2000 MG: 2 INJECTION, POWDER, FOR SOLUTION INTRAMUSCULAR; INTRAVENOUS at 14:45

## 2024-07-08 RX ADMIN — DOXYCYCLINE 100 MG: 100 CAPSULE ORAL at 16:20

## 2024-07-08 NOTE — ED NOTES
Pt presents via POV with multiple complaints. Pt reports acute worsening bilat hip pain and back pain onset yesterday, pt reports she has degenerative disc disease. Pt also reports flu-like symptoms onset 5 days ago, with cough. Pt also reports bilat ear pain as well. Pt alert/oriented/ambulatory.

## 2024-07-08 NOTE — ED PROVIDER NOTES
"Subjective   History of Present Illness  39-year-old female presents to the emergency room with a wide constellation of complaints some of which include \"just feeling sick since yesterday\", low-grade fever, cough and congestion, sore throat, body aches, dysuria, \"might have been exposed to sexually transmitted disease\", headache, nausea.      Review of Systems   Constitutional:  Positive for activity change, appetite change, fatigue and fever.   HENT:  Positive for congestion, ear pain and sore throat.    Respiratory:  Positive for cough.    Gastrointestinal:  Positive for nausea. Negative for abdominal pain, diarrhea and vomiting.   Genitourinary:  Positive for dysuria and urgency. Negative for vaginal bleeding, vaginal discharge and vaginal pain.   Musculoskeletal:  Positive for arthralgias and myalgias. Negative for back pain.   Skin:  Negative for rash and wound.   Neurological:  Positive for headaches.       History reviewed. No pertinent past medical history.    No Known Allergies    History reviewed. No pertinent surgical history.    History reviewed. No pertinent family history.    Social History     Socioeconomic History    Marital status: Single   Tobacco Use    Smoking status: Every Day     Current packs/day: 0.50     Types: Cigarettes    Smokeless tobacco: Never   Substance and Sexual Activity    Alcohol use: No    Drug use: No           Objective   Physical Exam  Vitals reviewed.   Constitutional:       General: She is not in acute distress.     Appearance: Normal appearance. She is not ill-appearing, toxic-appearing or diaphoretic.   HENT:      Head: Normocephalic and atraumatic.      Right Ear: Tympanic membrane, ear canal and external ear normal.      Left Ear: Tympanic membrane, ear canal and external ear normal.      Nose: No congestion or rhinorrhea.      Mouth/Throat:      Mouth: Mucous membranes are moist.      Pharynx: Oropharynx is clear.   Eyes:      Conjunctiva/sclera: Conjunctivae normal. "      Pupils: Pupils are equal, round, and reactive to light.   Cardiovascular:      Rate and Rhythm: Normal rate.      Pulses: Normal pulses.   Pulmonary:      Effort: Pulmonary effort is normal.   Abdominal:      Palpations: Abdomen is soft.      Tenderness: There is no abdominal tenderness. There is no guarding or rebound.   Musculoskeletal:         General: Normal range of motion.      Cervical back: Normal range of motion and neck supple.   Skin:     General: Skin is warm and dry.      Capillary Refill: Capillary refill takes less than 2 seconds.   Neurological:      General: No focal deficit present.      Mental Status: She is alert and oriented to person, place, and time.   Psychiatric:         Mood and Affect: Mood normal.         Behavior: Behavior normal.         Thought Content: Thought content normal.         Judgment: Judgment normal.         Procedures           ED Course      Labs Reviewed   URINALYSIS W/ CULTURE IF INDICATED - Abnormal; Notable for the following components:       Result Value    Leuk Esterase, UA Small (1+) (*)     All other components within normal limits    Narrative:     In absence of clinical symptoms, the presence of pyuria, bacteria, and/or nitrites on the urinalysis result does not correlate with infection.   COMPREHENSIVE METABOLIC PANEL - Abnormal; Notable for the following components:    CO2 21.4 (*)     All other components within normal limits    Narrative:     GFR Normal >60  Chronic Kidney Disease <60  Kidney Failure <15     CBC WITH AUTO DIFFERENTIAL - Abnormal; Notable for the following components:    Hemoglobin 11.1 (*)     MCHC 30.8 (*)     Eosinophil % 6.4 (*)     Eosinophils, Absolute 0.45 (*)     All other components within normal limits   URINALYSIS, MICROSCOPIC ONLY - Abnormal; Notable for the following components:    WBC, UA 6-10 (*)     Bacteria, UA 1+ (*)     Squamous Epithelial Cells, UA 7-12 (*)     All other components within normal limits    COVID-19/FLUA&B/RSV, NP SWAB IN TRANSPORT MEDIA 1 HR TAT - Normal    Narrative:     Fact sheet for providers: https://www.fda.gov/media/796133/download    Fact sheet for patients: https://www.fda.gov/media/653074/download    Test performed by PCR.   RAPID STREP A SCREEN - Normal   PREGNANCY, URINE - Normal   URINE CULTURE   CBC AND DIFFERENTIAL    Narrative:     The following orders were created for panel order CBC & Differential.  Procedure                               Abnormality         Status                     ---------                               -----------         ------                     CBC Auto Differential[[id number]]        Abnormal            Final result                 Please view results for these tests on the individual orders.                                 XR Chest 1 View    Result Date: 7/8/2024  Impression: No active pulmonary process. Electronically Signed: Rico Neves MD  7/8/2024 11:43 AM EDT  Workstation ID: [id number]          Medications   sodium chloride 0.9 % flush 10 mL (has no administration in time range)   cefTRIAXone (ROCEPHIN) 2,000 mg in sodium chloride 0.9 % 100 mL MBP (0 mg Intravenous Stopped 7/8/24 1515)   ondansetron ODT (ZOFRAN-ODT) disintegrating tablet 4 mg (4 mg Oral Given 7/8/24 1620)   metroNIDAZOLE (FLAGYL) tablet 500 mg (500 mg Oral Given 7/8/24 1620)   doxycycline (MONODOX) capsule 100 mg (100 mg Oral Given 7/8/24 1620)      Medical Decision Making  39-year-old female presents to the ER for a multitude of symptoms from various body systems.    Problems Addressed:  UTI (urinary tract infection), uncomplicated: complicated acute illness or injury     Details: Urinalysis is significant for 1+ bacteria, 6-10 white blood cells and 1+ leukocytes.  Although positive for squamous epithelials will treat prophylactically with 2 g of Rocephin via peripheral IV prior to discharge.  Also states that she has a possible STI exposure.  Will discharge home on Flagyl  regimen and doxycycline regimen.    Amount and/or Complexity of Data Reviewed  Labs: ordered.     Details: Labs Reviewed  URINALYSIS W/ CULTURE IF INDICATED - Abnormal; Notable for the following components:     Leuk Esterase, UA             Small (1+) (*)            All other components within normal limits         Narrative: In absence of clinical symptoms, the presence of pyuria, bacteria, and/or nitrites on the urinalysis result does not correlate with infection.  COMPREHENSIVE METABOLIC PANEL - Abnormal; Notable for the following components:     CO2                           21.4 (*)            All other components within normal limits         Narrative: GFR Normal >60                  Chronic Kidney Disease <60                  Kidney Failure <15                    CBC WITH AUTO DIFFERENTIAL - Abnormal; Notable for the following components:     Hemoglobin                    11.1 (*)               MCHC                          30.8 (*)               Eosinophil %                  6.4 (*)                Eosinophils, Absolute         0.45 (*)            All other components within normal limits  URINALYSIS, MICROSCOPIC ONLY - Abnormal; Notable for the following components:     WBC, UA                       6-10 (*)               Bacteria, UA                  1+ (*)                 Squamous Epithelial Cells, UA   7-12 (*)            All other components within normal limits  COVID-19/FLUA&B/RSV, NP SWAB IN TRANSPORT MEDIA 1 HR TAT - Normal         Narrative: Fact sheet for providers: https://www.fda.gov/media/859300/download                    Fact sheet for patients: https://www.fda.gov/media/777551/download                                    Test performed by PCR.  RAPID STREP A SCREEN - Normal  PREGNANCY, URINE - Normal  URINE CULTURE  CBC AND DIFFERENTIAL   Radiology: ordered.     Details: XR Chest 1 View    Result Date: 7/8/2024  Impression: No active pulmonary process. Electronically Signed: Rico Neves MD   7/8/2024 11:43 AM EDT  Workstation ID: [id number]     ECG/medicine tests: ordered.     Details: EKG = sinus rhythm, rate of 68.  Seen and signed by Dr Hany Bright.    Risk  Prescription drug management.  Risk Details: Discharge home to self-care on Flagyl and doxycycline regimen for possible STI exposure.        Final diagnoses:   UTI (urinary tract infection), uncomplicated       ED Disposition  ED Disposition       ED Disposition   Discharge    Condition   Stable    Comment   --               Eddi Berry MD  [address] [place] [address] [place] [address] [phone]    Schedule an appointment as soon as possible for a visit in 2 days  As needed, If symptoms worsen         Medication List        New Prescriptions      doxycycline 100 MG enteric coated tablet  Commonly known as: DORYX  Take 1 tablet by mouth 2 (Two) Times a Day for 7 days.               Where to Get Your Medications        These medications were sent to [facility]/pharmacy #[id number] - [place], [place] - [address] [place] [address] - [phone]  - [phone] [address] [place] [address]      Hours: 24-hours Phone: [phone]   doxycycline 100 MG enteric coated tablet            Jyoti Valero, APRN  07/08/24 0176

## 2024-07-08 NOTE — DISCHARGE INSTRUCTIONS
Rest and increase water intake to stay well hydrated.  Stop drinking coffees, teas and sodas.  Fill and take Doxyc  Doycline and Flagyl prescription, as directed.

## 2024-07-08 NOTE — Clinical Note
[facility] EMERGENCY DEPARTMENT  [address] [place] [address]  Phone: [phone]    Sumi Dale was seen and treated in our emergency department on 7/8/2024.  She may return to work on 07/08/2024.  May return to work asap       Thank you for choosing [facility].    Jyoti Valero APRN

## 2024-07-09 LAB
BACTERIA SPEC AEROBE CULT: NO GROWTH
QT INTERVAL: 381 MS
QTC INTERVAL: 406 MS

## 2024-07-11 RX ORDER — DOXYCYCLINE HYCLATE 100 MG/1
100 CAPSULE ORAL 2 TIMES DAILY
Qty: 14 CAPSULE | Refills: 0 | Status: SHIPPED | OUTPATIENT
Start: 2024-07-11
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
## 2024-07-17 ENCOUNTER — OFFICE VISIT (OUTPATIENT)
Dept: NEUROLOGY | Facility: CLINIC | Age: 39
End: 2024-07-17
Payer: MEDICAID

## 2024-07-17 VITALS
HEIGHT: 64 IN | SYSTOLIC BLOOD PRESSURE: 109 MMHG | DIASTOLIC BLOOD PRESSURE: 72 MMHG | HEART RATE: 92 BPM | WEIGHT: 138 LBS | BODY MASS INDEX: 23.56 KG/M2

## 2024-07-17 DIAGNOSIS — M54.40 CHRONIC LOW BACK PAIN WITH SCIATICA, SCIATICA LATERALITY UNSPECIFIED, UNSPECIFIED BACK PAIN LATERALITY: ICD-10-CM

## 2024-07-17 DIAGNOSIS — S09.90XS HEADACHES DUE TO OLD HEAD INJURY: Primary | ICD-10-CM

## 2024-07-17 DIAGNOSIS — G89.29 CHRONIC LOW BACK PAIN WITH SCIATICA, SCIATICA LATERALITY UNSPECIFIED, UNSPECIFIED BACK PAIN LATERALITY: ICD-10-CM

## 2024-07-17 DIAGNOSIS — R41.3 MEMORY LOSS: ICD-10-CM

## 2024-07-17 DIAGNOSIS — G44.309 HEADACHES DUE TO OLD HEAD INJURY: Primary | ICD-10-CM

## 2024-07-18 ENCOUNTER — PATIENT ROUNDING (BHMG ONLY) (OUTPATIENT)
Dept: NEUROLOGY | Facility: CLINIC | Age: 39
End: 2024-07-18
Payer: MEDICAID

## 2024-07-23 ENCOUNTER — TELEPHONE (OUTPATIENT)
Dept: NEUROLOGY | Facility: CLINIC | Age: 39
End: 2024-07-23
Payer: COMMERCIAL

## 2024-07-23 NOTE — TELEPHONE ENCOUNTER
Caller: Sumi Dale    Relationship: Self    Best call back number: [phone]    What was the call regarding: PT CALLING TO CHECK FOR UPDATE REGARDING HER REFERRAL TO PAIN MANAGEMENT. PLEASE REACH OUT TO PT WITH NEXT STEPS.    Do you require a callback: YES, PLEASE.    PLEASE REVIEW AND ADVISE.

## 2024-07-23 NOTE — TELEPHONE ENCOUNTER
Notify her the referral was sent 7/17/2024.  Please call pain management and make sure that they got the referral okay thank you.

## 2024-07-31 DIAGNOSIS — M54.40 CHRONIC LOW BACK PAIN WITH SCIATICA, SCIATICA LATERALITY UNSPECIFIED, UNSPECIFIED BACK PAIN LATERALITY: Primary | ICD-10-CM

## 2024-07-31 DIAGNOSIS — G89.29 CHRONIC LOW BACK PAIN WITH SCIATICA, SCIATICA LATERALITY UNSPECIFIED, UNSPECIFIED BACK PAIN LATERALITY: Primary | ICD-10-CM

## 2024-07-31 NOTE — TELEPHONE ENCOUNTER
COULDN'T LEAVE MESSAGE MAN WHO ANSWERED DIDN'T KNOW OF ANY OTHER NUMBER TO REACH HER. NEED TO INFORM HER [facility] IS THE ONLY PLACE SHE CAN [facility] DUE TO HER INSURANCE. AND TO INFORM HER XRAY ORDER IS IN

## 2024-07-31 NOTE — TELEPHONE ENCOUNTER
RITA PLEASE READ MESSAGE.   PLEASE PUT IN ORDER.      Pain management will not set up appointment for the patient until   -----NEED RECENT IMAGING OF THE LOWER BACK NO OLDER THAN A YEAR.(EVERYTHING ELSE LOOKS GOOD) ---

## 2024-08-06 ENCOUNTER — TELEPHONE (OUTPATIENT)
Dept: NEUROLOGY | Facility: CLINIC | Age: 39
End: 2024-08-06
Payer: COMMERCIAL

## 2024-08-06 NOTE — TELEPHONE ENCOUNTER
Spoke to patient routed pain management advised her about her Neuropsych she grecia if she has to she will go to , checking her insurance again.  She said she MIGHT have other insurance other then S if so I will send to [facility]
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
## 2024-08-15 ENCOUNTER — TELEPHONE (OUTPATIENT)
Dept: NEUROLOGY | Facility: CLINIC | Age: 39
End: 2024-08-15
Payer: COMMERCIAL

## 2024-08-15 NOTE — TELEPHONE ENCOUNTER
Pt aware, [facility] received the referral will be calling her and forward the pain management over

## 2024-08-15 NOTE — TELEPHONE ENCOUNTER
Caller: Sumi Dale    Relationship: Self    Best call back number: [phone] (home)     What was the call regarding: PATIENT CALLED TO SEE WHEN HER REFERRAL FOR PAIN MANAGEMENT WOULD BE READY SHE STATED SHE HAD HER XRAY DONE ON 8/13/24 @ [facility].     SHE ALSO STATED SOMEONE WAS TO CONTACT HER REGARDING AN APPT @ [facility]. SHE STATED SHE HAS NOT HEARD ANYTHING.     PLEASE REVIEW  THANK YOU

## 2024-08-20 ENCOUNTER — TELEPHONE (OUTPATIENT)
Dept: NEUROLOGY | Facility: CLINIC | Age: 39
End: 2024-08-20
Payer: COMMERCIAL

## 2024-08-20 NOTE — TELEPHONE ENCOUNTER
PAIN MANAGEMENT====  UPON MD REVIEW THERE IS NOTHING THEY CAN OFFER THIS PATIENT AND ALSO UPON INSURANCE REVIEW WE DO NOT ACCEPT HER INSURANCE. .       WHAT DO YOU SUGGEST LENKA

## 2024-08-30 ENCOUNTER — TELEPHONE (OUTPATIENT)
Dept: NEUROLOGY | Facility: CLINIC | Age: 39
End: 2024-08-30
Payer: COMMERCIAL

## 2024-08-30 NOTE — TELEPHONE ENCOUNTER
Neuropsych declined referral  Reasons  Possible referral to social work  Would need stable housing and access to resources maybe could interview

## 2024-09-03 NOTE — TELEPHONE ENCOUNTER
They declined the referral and it what is listed in previous message is the only reasons they gave.  Best you refer to Dr. Seipel or Juany and they can referr to reymundo and I will send their office note and try again.

## 2024-09-09 ENCOUNTER — TELEPHONE (OUTPATIENT)
Dept: NEUROLOGY | Facility: CLINIC | Age: 39
End: 2024-09-09
Payer: COMMERCIAL

## 2024-09-18 DIAGNOSIS — G89.29 CHRONIC LOW BACK PAIN WITH SCIATICA, SCIATICA LATERALITY UNSPECIFIED, UNSPECIFIED BACK PAIN LATERALITY: Primary | ICD-10-CM

## 2024-09-18 DIAGNOSIS — M54.40 CHRONIC LOW BACK PAIN WITH SCIATICA, SCIATICA LATERALITY UNSPECIFIED, UNSPECIFIED BACK PAIN LATERALITY: Primary | ICD-10-CM

## 2024-09-30 ENCOUNTER — TELEPHONE (OUTPATIENT)
Dept: NEUROLOGY | Facility: CLINIC | Age: 39
End: 2024-09-30
Payer: COMMERCIAL

## 2024-09-30 NOTE — TELEPHONE ENCOUNTER
Provider:GABINO VYAS    Caller: JENI    Phone Number: [phone]     Reason for Call: PT CALLED AND WOULD LIKE A CALL BACK ABOUT PAIN MANAGEMENT REFERRAL AND ENCOUNTER FROM 09/09/24.    THANKYOU

## 2024-10-01 DIAGNOSIS — M54.40 CHRONIC LOW BACK PAIN WITH SCIATICA, SCIATICA LATERALITY UNSPECIFIED, UNSPECIFIED BACK PAIN LATERALITY: Primary | ICD-10-CM

## 2024-10-01 DIAGNOSIS — G44.309 HEADACHES DUE TO OLD HEAD INJURY: ICD-10-CM

## 2024-10-01 DIAGNOSIS — S09.90XS HEADACHES DUE TO OLD HEAD INJURY: ICD-10-CM

## 2024-10-01 DIAGNOSIS — G89.29 CHRONIC LOW BACK PAIN WITH SCIATICA, SCIATICA LATERALITY UNSPECIFIED, UNSPECIFIED BACK PAIN LATERALITY: Primary | ICD-10-CM

## 2024-10-01 RX ORDER — GABAPENTIN 300 MG/1
CAPSULE ORAL
Qty: 90 CAPSULE | Refills: 5 | Status: SHIPPED | OUTPATIENT
Start: 2024-10-01

## 2024-10-01 NOTE — TELEPHONE ENCOUNTER
REVIEWED PHONE MESSAGE FROM BEFORE, PROVIDER ADVISED TO START GABAPENTIN, AMENDED  THE PHONE NOTE AND ASKED FOR THE RX TO BE SENT

## 2024-12-03 ENCOUNTER — TELEPHONE (OUTPATIENT)
Dept: NEUROLOGY | Facility: CLINIC | Age: 39
End: 2024-12-03

## 2024-12-03 NOTE — TELEPHONE ENCOUNTER
Sonam this is the patient I spoke to you about a few weeks ago. We have tried referring her to 4 different pain mgmt centers and they either won't accept her or don't take her insurance. Just keeping you informed.

## 2024-12-03 NOTE — TELEPHONE ENCOUNTER
Provider: RITA ARNOLD APRN    Caller: Sumi Dale    Relationship to Patient: Self    Phone Number: [phone]    Reason for Call:  CALLING REGARDING THE REFERRAL FOR PAIN MANAGEMENT IN [place].   STATED SHE CALLED THEM AND WAS TOLD THEY DO NOT TAKE HER INSURANCE ([employer] [place] [employer]).  CALLING TO SEE IF THE PROVIDER CAN SEND THE REFERRAL TO ANOTHER PAIN  MANAGEMENT FACILITY.    When was the patient last seen:  6-24-24        PLEASE CALL & ADVISE

## 2025-01-23 NOTE — PROGRESS NOTES
Subjective: Headaches and back pain, memory loss    Patient ID: Sumi Dale is a 40 y.o. female.    History of Present Illness Ms. Dale is a 39-year-old  female who will be presenting for a follow-up today on headaches and back pain.  She was referred to neuropsychology however they declined the referral at this time.  She was homeless at the last visit and [facility]  She also was referred to the pain clinic.  The patient states that she cannot go to the pain clinic with her current insurance.  She states she is living with her grandfather now.  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  She states her neck pain and her back pain is worse and that the gabapentin is no longer controlling it.      Headaches- Gabapentin 300 mg 1 tablet tid  Frequency: every day, dull ache   Low back pain-hard ache  Gabapentin not helping      Headaches  Testing:   [facility]-ray 4 view [facility]  Unremarkable appearance of skull.  Signed by Kevyn Kahn.  Treatment: Gabapentin 300 3 times daily    Low back pain   Lumbar spine film completed at [facility] on 8/14/2024  Impression: Negative other than minimal scoliosis.  Electronically signed by Kevyn Kahn MD 8/15/2024  Treatment: Referred to PT patient refused  Treatment: Gabapentin 300 3 times daily      Neck films scanned in  Normal limits        The following portions of the patient's history were reviewed and updated as appropriate: allergies, current medications, past family history, past medical history, past social history, past surgical history and problem list.    History reviewed. No pertinent family history.    History reviewed. No pertinent past medical history.    Social History     Socioeconomic History    Marital status: Single   Tobacco Use    Smoking status: Every Day     Current packs/day: 0.50     Types: Cigarettes    Smokeless tobacco: Never   Substance and Sexual Activity    Alcohol  use: No    Drug use: No         Current Outpatient Medications:     doxycycline (VIBRAMYCIN) 100 MG capsule, Take 1 capsule by mouth 2 (Two) Times a Day., Disp: 14 capsule, Rfl: 0    gabapentin (NEURONTIN) 300 MG capsule, 1 in am and 1 in pm and 2 at hs, Disp: 120 capsule, Rfl: 5    Review of Systems   All other systems reviewed and are negative.         I have reviewed ROS completed by medical assistant.     Objective:      Neurological Exam  Mental Status  Awake and alert. Oriented only to person, place and time. Speech is normal. Language is fluent with no aphasia. Attention and concentration are normal. Fund of knowledge is appropriate for level of education.    Cranial Nerves  CN II: Right visual acuity: Normal. Left visual acuity: Normal. Right normal visual field. Left normal visual field.  CN III, IV, VI: Extraocular movements intact bilaterally. No nystagmus. Normal saccades. Normal smooth pursuit.   Right pupil: 2 mm. Reactive to light. Reactive to accommodation.   Left pupil: 2 mm. Reactive to light. Reactive to accommodation.  CN V:  Right: Facial sensation is normal.  Left: Facial sensation is normal on the left.  CN VII:  Right: There is no facial weakness.  Left: There is no facial weakness.  CN IX, X: Palate elevates symmetrically    Motor  Normal muscle bulk throughout. No fasciculations present. Normal muscle tone.    Sensory  Light touch is normal in upper and lower extremities.     Gait  Casual gait is normal including stance, stride, and arm swing.       Assessment/Plan:  Discussion: I did review the patient's films with her again.  Both her back films and her head films as well as neck.  There were no surgical indications.  I offered physical therapy and the patient refused.  I offered the pain clinic and she states her insurance will not let her go.  I offered to increase the gabapentin at bedtime and she agreed.      Diagnoses and all orders for this visit:    1. Chronic low back pain with  sciatica, sciatica laterality unspecified, unspecified back pain laterality  -     gabapentin (NEURONTIN) 300 MG capsule; 1 in am and 1 in pm and 2 at hs  Dispense: 120 capsule; Refill: 5    2. Headaches due to old head injury  -     gabapentin (NEURONTIN) 300 MG capsule; 1 in am and 1 in pm and 2 at hs  Dispense: 120 capsule; Refill: 5          Return in about 6 months (around 7/27/2025) for Sonam Cardona.     I spent 27 minutes caring for Sumi on this date of service. This time includes time spent by me in the following activities: preparing for the visit, reviewing tests, obtaining and/or reviewing a separately obtained history, performing a medically appropriate examination and/or evaluation, counseling and educating the patient/family/caregiver, ordering medications, tests, or procedures, and documenting information in the medical record.      This document has been electronically signed by JESSY Desai on January 27, 2025 14:00 EST

## 2025-01-27 ENCOUNTER — OFFICE VISIT (OUTPATIENT)
Dept: NEUROLOGY | Facility: CLINIC | Age: 40
End: 2025-01-27
Payer: MEDICAID

## 2025-01-27 VITALS — WEIGHT: 135 LBS | BODY MASS INDEX: 23.05 KG/M2 | HEIGHT: 64 IN

## 2025-01-27 DIAGNOSIS — M54.40 CHRONIC LOW BACK PAIN WITH SCIATICA, SCIATICA LATERALITY UNSPECIFIED, UNSPECIFIED BACK PAIN LATERALITY: ICD-10-CM

## 2025-01-27 DIAGNOSIS — G44.309 HEADACHES DUE TO OLD HEAD INJURY: ICD-10-CM

## 2025-01-27 DIAGNOSIS — G89.29 CHRONIC LOW BACK PAIN WITH SCIATICA, SCIATICA LATERALITY UNSPECIFIED, UNSPECIFIED BACK PAIN LATERALITY: ICD-10-CM

## 2025-01-27 DIAGNOSIS — S09.90XS HEADACHES DUE TO OLD HEAD INJURY: ICD-10-CM

## 2025-01-27 PROCEDURE — 1159F MED LIST DOCD IN RCRD: CPT | Performed by: NURSE PRACTITIONER

## 2025-01-27 PROCEDURE — 1160F RVW MEDS BY RX/DR IN RCRD: CPT | Performed by: NURSE PRACTITIONER

## 2025-01-27 PROCEDURE — 99213 OFFICE O/P EST LOW 20 MIN: CPT | Performed by: NURSE PRACTITIONER

## 2025-01-27 RX ORDER — GABAPENTIN 300 MG/1
CAPSULE ORAL
Qty: 120 CAPSULE | Refills: 5 | Status: SHIPPED | OUTPATIENT
Start: 2025-01-27

## 2025-05-24 DIAGNOSIS — G44.309 HEADACHES DUE TO OLD HEAD INJURY: ICD-10-CM

## 2025-05-24 DIAGNOSIS — G89.29 CHRONIC LOW BACK PAIN WITH SCIATICA, SCIATICA LATERALITY UNSPECIFIED, UNSPECIFIED BACK PAIN LATERALITY: ICD-10-CM

## 2025-05-24 DIAGNOSIS — M54.40 CHRONIC LOW BACK PAIN WITH SCIATICA, SCIATICA LATERALITY UNSPECIFIED, UNSPECIFIED BACK PAIN LATERALITY: ICD-10-CM

## 2025-05-24 DIAGNOSIS — S09.90XS HEADACHES DUE TO OLD HEAD INJURY: ICD-10-CM

## 2025-05-27 RX ORDER — GABAPENTIN 300 MG/1
CAPSULE ORAL
Qty: 90 CAPSULE | Refills: 5 | Status: SHIPPED | OUTPATIENT
Start: 2025-05-27